# Patient Record
Sex: FEMALE | NOT HISPANIC OR LATINO | URBAN - METROPOLITAN AREA
[De-identification: names, ages, dates, MRNs, and addresses within clinical notes are randomized per-mention and may not be internally consistent; named-entity substitution may affect disease eponyms.]

---

## 2024-05-23 ENCOUNTER — APPOINTMENT (OUTPATIENT)
Dept: AUDIOLOGY | Facility: CLINIC | Age: 79
End: 2024-05-23

## 2024-07-18 ENCOUNTER — APPOINTMENT (OUTPATIENT)
Dept: PHYSICAL THERAPY | Facility: CLINIC | Age: 79
End: 2024-07-18
Payer: MEDICARE

## 2024-07-22 ENCOUNTER — APPOINTMENT (OUTPATIENT)
Dept: AUDIOLOGY | Facility: CLINIC | Age: 79
End: 2024-07-22

## 2024-07-22 DIAGNOSIS — H90.3 SENSORINEURAL HEARING LOSS (SNHL) OF BOTH EARS: Primary | ICD-10-CM

## 2024-07-22 PROCEDURE — 92557 COMPREHENSIVE HEARING TEST: CPT | Performed by: AUDIOLOGIST

## 2024-07-22 PROCEDURE — 92550 TYMPANOMETRY & REFLEX THRESH: CPT | Performed by: AUDIOLOGIST

## 2024-07-22 NOTE — PROGRESS NOTES
AUDIOLOGY ADULT AUDIOMETRIC EVALUATION    Name:  Rosy Brownnig  :  1945  Age:  78 y.o.  Date of Evaluation:  2024    Reason for visit: Ms. Browning is seen in the clinic today at the request of otolaryngology for an audiologic evaluation.     HISTORY  Patient complains of  not hearing as well with or without aids and has been researching hearing aids.  She denies dizzy, tinnitus and fullness and aids are from .    EVALUATION  See scanned audiogram: “Media” > “Audiology Report”.      RESULTS  Otoscopic Evaluation:  Right Ear: clear ear canal  Left Ear: clear ear canal    Immittance Measures:  Tympanometry:  Right Ear: Type As, reduced tympanic membrane mobility with normal middle ear pressure   Left Ear: Type As, reduced tympanic membrane mobility with normal middle ear pressure     Acoustic Reflexes:  Ipsilateral Right Ear: NR NR NR NR   Ipsilateral Left Ear:   NR NR NR NR  Contralateral Right Ear: did not evaluate  Contralateral Left Ear: did not evaluate      Audiometry:  Test Technique and Reliability:  Behavioral   Standard audiometry via supra-aural headphones. Reliability is good.    Pure tone air and bone conduction audiometry:  Right Ear:  STABLE FROM , MILD MODERATE TO SEVERE SNHL.  Left Ear: STABLE SNHL FROM . MILD MODERATE TO SEVERE SNHL.    Speech Audiometry (Word Recognition Scores):   Right Ear:  76 % FAIR  Left Ear:    89 % GOOD    IMPRESSIONS  STABLE HEARING LOSS FROM .  The presence of acoustic reflexes within normal intensity limits is consistent with normal middle ear and brainstem function, and suggests that auditory sensitivity is not significantly impaired. An elevated or absent acoustic reflex threshold is consistent with a middle ear disorder, hearing loss in the stimulated ear, and/or interruption of neural innervation of the stapedius muscle. Present DPOAEs suggest normal/near normal cochlear outer hair cell function and are consistent with no greater  than a mild hearing loss at those frequencies. Absent DPOAEs are consistent with abnormal cochlear outer hair cell function and some degree of hearing loss at those frequencies.    RECOMMENDATIONS  - Follow up with otolaryngology today as scheduled.SEE ENT AS RECOMMENDED FOR HEARING LOSS.  - Audiologic evaluation as needed.  - Annual audiologic evaluation, sooner if an acute change is noted.  - Audiologic evaluation in conjunction with otologic care, if an acute change is noted, and/or annually.  - Consider NEW hearing aids. DISCUSSED AND GAVE PRICING AND [employer]/ [employer]. Contact insurance to determine if there is an applicable benefit and where it can be used. Contact our office to schedule an appointment should she wish to proceed with hearing aids through our clinic.  - Consider  NEW hearing aids. Contact insurance to determine if there is an applicable benefit and where it can be used.  - Continued hearing aid use. AIDS ARE OLD AND DIFFICULT TO CONNECT.   - Follow-up with audiology annually for routine hearing aid maintenance, sooner if questions/problems arise.  - Follow-up with medical care team as planned.    PATIENT EDUCATION  Discussed results, impressions and recommendations with the patient. Questions were addressed and the patient was encouraged to contact our office should concerns arise.    Time for this encounter: 1 HOUR    HEARING AID CHECK    RIGHT: PHONAK AUDEO S SMART 5  C SHELL.  SN: [id number]  LEFT:PHONAK AUDEO S SMART 5  C SHELL.  SN: [id number]  FIT DATE: 2014  WARRANTY: NONE    This patient was seen for a HAC with the complaint that  she may want new aids and not hearing as well.   Otoscopy : CLEAR     The following programming changes were made: difficult connection , but cleaned and checked and raised as much as possible.  Only wearing a few hours per day but is interested in new aids.  Gave information and discussed but will need a HAE. Reminded to see ENT as has been a long time and  did put in new thresholds after audio today.    The patient paid  $65.00  for today's visit.  Return in 6 months or sooner if needed.    APPOINTMENT TIME:  total 1 hour for 2 appointments.  Nena Douglass  Licensed Audiologist

## 2024-08-20 ENCOUNTER — EVALUATION (OUTPATIENT)
Dept: PHYSICAL THERAPY | Facility: CLINIC | Age: 79
End: 2024-08-20
Payer: MEDICARE

## 2024-08-20 DIAGNOSIS — R29.898 MUSCULAR DECONDITIONING: ICD-10-CM

## 2024-08-20 DIAGNOSIS — Z74.09 DECLINING MOBILITY: ICD-10-CM

## 2024-08-20 DIAGNOSIS — G89.29 CHRONIC LOW BACK PAIN: ICD-10-CM

## 2024-08-20 DIAGNOSIS — M54.50 CHRONIC LOW BACK PAIN: ICD-10-CM

## 2024-08-20 DIAGNOSIS — M48.061 LUMBAR STENOSIS: ICD-10-CM

## 2024-08-20 PROCEDURE — 97161 PT EVAL LOW COMPLEX 20 MIN: CPT | Mod: GP

## 2024-08-20 ASSESSMENT — PAIN - FUNCTIONAL ASSESSMENT: PAIN_FUNCTIONAL_ASSESSMENT: 0-10

## 2024-08-20 ASSESSMENT — PAIN DESCRIPTION - DESCRIPTORS: DESCRIPTORS: ACHING

## 2024-08-20 ASSESSMENT — PAIN SCALES - GENERAL: PAINLEVEL_OUTOF10: 5 - MODERATE PAIN

## 2024-08-20 NOTE — PROGRESS NOTES
Physical Therapy Evaluation     Patient Name: Rosy Browning  MRN: [id number]  Today's Date: 8/20/2024  Time Calculation  Start Time: 1500  Stop Time: 1540  Time Calculation (min): 40 min  PT Evaluation Time Entry  PT Evaluation (Low) Time Entry: 40        Insurance Considerations:  MEDICARE A/B, MMO SUPP, [place], NO AUTH ( $0 USED PT/ST)     Problem List Items Addressed This Visit    None  Visit Diagnoses         Codes    Chronic low back pain     M54.50, G89.29    Relevant Orders    Follow Up In Physical Therapy    Lumbar stenosis     M48.061    Relevant Orders    Follow Up In Physical Therapy    Muscular deconditioning     R29.898    Relevant Orders    Follow Up In Physical Therapy    Declining mobility     Z74.09    Relevant Orders    Follow Up In Physical Therapy            Subjective  /General:  General  Reason for Referral: Chronic low back pain, lumbar stenosis, muscular deconditioning, declining mobility  Referred By: Ziggy Murray  Patient reported hx of current condition: The pt presents accompanied by her . The pt reports that she has had low back pain with radiating symptoms into both legs for 4-5 years and it began insidiously. Since December, she has had 3 epidural injections and they have helped (most recent was mid July). Since the most recent injection, her pain has been limited to the lateral L thigh. She notes that she tried PT for a few sessions 1-2 years ago and she primarily focused on balance, but did not feel that she made significant progress. The pt is interested in aquatic PT as well as gait training and PT that will allow her to meet her goal of participating in different types of exercise, such as yoga, where she may need to get up and down off the floor in the future (she is currently unable to).     Aggravating factors: walking (immediately), standing, stair climbing   Relieving factors: sitting, laying down    Precautions/ Red Flags:  Precautions  Precautions Comment: + hx  of inner thigh numbness    Red flags   Hx of CA No   Pacemaker/ Electronic Implant No   Saddle Anesthesia Yes - has inner thigh numbness when her epidural injections wear off    Bowel/Bladder Changes No   Sudden Weakness No   Recent Falls (within last 6mo) No     Relevant PMH:  L TKA 2017  Spinal stenosis  Nueropathy    Pain:  Pain Assessment  Pain Assessment: 0-10  0-10 (Numeric) Pain Score: 5 - Moderate pain  Pain Type: Chronic pain  Pain Location: Back  Pain Orientation: Mid, Lower  Pain Radiating Towards: L glute and lateral L thigh -- has hx of radiating into bilat glutes and LEs  Pain Descriptors: Aching  Home Living:    Lives with: Spouse  Home type: House  Stairs: Yes - has handrails  Occupation: retired  Hobbies/activities: walking   Prior Function Per Pt/Caregiver Report:  Prior Function Per Pt/Caregiver Report  Level of [place]:  (The pt reports that she utilizes a SPC sometimes)    Objective   Posture:  Posture Comment: The pt sits with a forward flexed trunk. The pt stands with a guarded posture, wide DAV, and flexed trunk.  Gait:  Gait Comment: The pt ambulates mod I with a SPC. The pt ambulates with a wide DAV, shortened step lengths, and flexed trunk posture. The pt appears to struggle with navigating a straight path.  Other:     Lumbar AROM Range   Flexion 50% limitation    Extension 75% limitation   R sidebend 75% limitation   L sidebend 75% limitation   Comments: The pt appears very guarded in movements and unsteady when standing without support. The pt noted discomfort with all movements.      Hip MMT L R   Hip Flexion 4-/5 4-/5   External Rotation 4/5 4/5   Internal Rotation 4/5 4/5      Knee MMT L R   Knee Flexion 4+/5 4+/5   Knee Extension 4+/5 4+/5      Ankle MMT L R   Dorsiflexion 4+/5 4+/5   Plantarflexion 4+/5 4+/5     Outcome Measures:  Other Measures  5x Sit to Stand: 25.31sec  Oswestry Disablity Index (DARLING): 23/50     Assessment:  PT Assessment Results: Decreased strength,  Decreased range of motion, Decreased mobility, Impaired balance, Pain  Rehab Prognosis: Fair    Pt is a 78 y.o. female who presents with signs and symptoms consistent with stenotic changes of the lumbar spine as well as physical deconditioning. The current impairments have led to functional limitations that include: walking, standing, and participating in her preferred types of exercise, such as yoga. Pt would benefit from skilled physical therapy intervention to improve impairments and facilitate return to prior function.    Plan:  Treatment/Interventions: Aquatic therapy, Cryotherapy, Education/ Instruction, Electrical stimulation, Gait training, Manual therapy, Neuromuscular re-education, Therapeutic activities, Therapeutic exercises  PT Plan: Skilled PT  PT Frequency:  (1-2x/wk)  Duration: 10 visits (Consider transition to land when appropriate to work on gait, balance, and strengthening to help pt get on and off the floor for her goal of participation in yoga classes.)  Onset Date: 08/20/24  Certification Period Start Date: 08/20/24  Certification Period End Date: 11/18/24  Number of Treatments Authorized: MN/MC cap  Rehab Potential: Fair    Plan for next visit: Initiate aquatics    OP EDUCATION:  Outpatient Education  Individual(s) Educated: Patient, Spouse  Education Provided: Fall Risk, POC, Other (aquatic PT info)  Risk and Benefits Discussed with Patient/Caregiver/Other: yes  Patient/Caregiver Demonstrated Understanding: yes  Plan of Care Discussed and Agreed Upon: yes  Patient Response to Education: Patient/Caregiver Verbalized Understanding of Information, Patient/Caregiver Asked Appropriate Questions  Provided tour of the pool area as well as education regarding aquatic PT sessions.    Today's Treatment:  Tour of the pool area    Goals:  Active       PT Problem       PT STG       Start:  08/20/24    Expected End:  10/04/24       - Pt will complete the HEP with <3 verbal cues for correction,   - Pt will  demonstrate 2pt improvement on the NPRS, allowing for improved tolerance of functional activities.,   - Pt will demonstrate min losses in all lumbar AROM, without onset of pain, allowing for improved ability to perform functional activities.,   - Pt will demonstrate ability to ambulate 300' with least restrictive device and equal stance time bilat in order to begin demoing normalized gait pattern.,   - Pt will demonstrate at least 4/5 MMT grading throughout LE musculature, allowing for appropriate muscle recruitment during daily activities. ,          PT LTG       Start:  08/20/24    Expected End:  11/18/24       - Pt will be independent in HEP & symptom management,   - Pt will demonstrate 4pt improvement on the NPRS, allowing for improved tolerance of functional activities. ,   - Pt will demonstrate ability to ambulate at least 350' with least restrictive/no device without breaks or increases in pain in order to demonstrate improved tolerance to prolonged ambulation.   - Pt will obtain and maintain a neutral posture throughout PT session in order to allow for proper muscle length-tension relationships and recruitment for daily tasks,   - Pt will demonstrate improved 5x sit to stand to <16 seconds in order to decrease risk of falls.  - Pt will demonstrate ability to ambulate up the pool steps with reciprocal pattern and 1 UE support.   - Pt will demonstrate improved OSWESTRY score by 13 points (MCID) in order to demonstrate improved functional mobility.

## 2024-08-20 NOTE — LETTER
August 20, 2024    Ziggy Murray MD  [address] [facility] [address]    Patient: Rosy Browning   YOB: 1945   Date of Visit: 8/20/2024       Dear Ziggy Murray MD  [address] [id number]    The attached plan of care is being sent to you because your patient’s medical reimbursement requires that you certify the plan of care. Your signature is required to allow uninterrupted insurance coverage.      You may indicate your approval by signing below and faxing this form back to us at Dept Fax: [phone].    Please call Dept: [phone] with any questions or concerns.    Thank you for this referral,        Iram Rome PT  [facility] [address] [place] [address]    Payer: Payor: MEDICARE / Plan: MEDICARE PART A AND B / Product Type: *No Product type* /                                                                         Date:     Dear Iram Rome PT,     Re: Ms. Rosy Browning, MRN:[id number]    I certify that I have reviewed the attached plan of care and it is medically necessary for Ms. Rosy Browning (1945) who is under my care.          ______________________________________                    _________________  Provider name and credentials                                           Date and time                                                                                           Plan of Care 8/20/24   Effective from: 8/20/2024  Effective to: 11/18/2024    Plan ID: [id number]            Participants as of Finalize on 8/20/2024    Name Type Comments Contact Info    Ziggy Murray MD Referring Provider  [phone]    Iram Rome PT Physical Therapist  [phone]       Last Plan Note     Author: Iram Rome PT Status: Incomplete Last edited: 8/20/2024  3:00 PM           Physical Therapy Evaluation     Patient Name: Rosy Browning  MRN: [id number]  Today's Date:  8/20/2024  Time Calculation  Start Time: 1500  Stop Time: 1540  Time Calculation (min): 40 min  PT Evaluation Time Entry  PT Evaluation (Low) Time Entry: 40        Insurance Considerations:  MEDICARE A/B, MMO SUPP, [place], NO AUTH ( $0 USED PT/ST)     Problem List Items Addressed This Visit    None  Visit Diagnoses         Codes    Chronic low back pain     M54.50, G89.29    Relevant Orders    Follow Up In Physical Therapy    Lumbar stenosis     M48.061    Relevant Orders    Follow Up In Physical Therapy    Muscular deconditioning     R29.898    Relevant Orders    Follow Up In Physical Therapy    Declining mobility     Z74.09    Relevant Orders    Follow Up In Physical Therapy            Subjective /General:  General  Reason for Referral: Chronic low back pain, lumbar stenosis, muscular deconditioning, declining mobility  Referred By: Ziggy Murray  Patient reported hx of current condition: The pt presents accompanied by her . The pt reports that she has had low back pain with radiating symptoms into both legs for 4-5 years and it began insidiously. Since December, she has had 3 epidural injections and they have helped (most recent was mid July). Since the most recent injection, her pain has been limited to the lateral L thigh. She notes that she tried PT for a few sessions 1-2 years ago and she primarily focused on balance, but did not feel that she made significant progress. The pt is interested in aquatic PT as well as gait training and PT that will allow her to meet her goal of participating in different types of exercise, such as yoga, where she may need to get up and down off the floor in the future (she is currently unable to).     Aggravating factors: walking (immediately), standing, stair climbing   Relieving factors: sitting, laying down    Precautions/ Red Flags:  Precautions  Precautions Comment: + hx of inner thigh numbness    Red flags   Hx of CA No   Pacemaker/ Electronic Implant No   Saddle  Anesthesia Yes - has inner thigh numbness when her epidural injections wear off    Bowel/Bladder Changes No   Sudden Weakness No   Recent Falls (within last 6mo) No     Relevant PMH:  L TKA 2017  Spinal stenosis  Nueropathy    Pain:  Pain Assessment  Pain Assessment: 0-10  0-10 (Numeric) Pain Score: 5 - Moderate pain  Pain Type: Chronic pain  Pain Location: Back  Pain Orientation: Mid, Lower  Pain Radiating Towards: L glute and lateral L thigh -- has hx of radiating into bilat glutes and LEs  Pain Descriptors: Aching  Home Living:    Lives with: Spouse  Home type: House  Stairs: Yes - has handrails  Occupation: retired  Hobbies/activities: walking   Prior Function Per Pt/Caregiver Report:  Prior Function Per Pt/Caregiver Report  Level of [place]:  (The pt reports that she utilizes a SPC sometimes)    Objective  Posture:  Posture Comment: The pt sits with a forward flexed trunk. The pt stands with a guarded posture, wide DAV, and flexed trunk.  Gait:  Gait Comment: The pt ambulates mod I with a SPC. The pt ambulates with a wide DAV, shortened step lengths, and flexed trunk posture. The pt appears to struggle with navigating a straight path.  Other:     Lumbar AROM Range   Flexion 50% limitation    Extension 75% limitation   R sidebend 75% limitation   L sidebend 75% limitation   Comments: The pt appears very guarded in movements and unsteady when standing without support. The pt noted discomfort with all movements.      Hip MMT L R   Hip Flexion 4-/5 4-/5   External Rotation 4/5 4/5   Internal Rotation 4/5 4/5      Knee MMT L R   Knee Flexion 4+/5 4+/5   Knee Extension 4+/5 4+/5      Ankle MMT L R   Dorsiflexion 4+/5 4+/5   Plantarflexion 4+/5 4+/5     Outcome Measures:  Other Measures  5x Sit to Stand: 25.31sec  Oswestry Disablity Index (DARLING): 23/50     Assessment:  PT Assessment Results: Decreased strength, Decreased range of motion, Decreased mobility, Impaired balance, Pain  Rehab Prognosis: Fair    Pt is a  78 y.o. female who presents with signs and symptoms consistent with stenotic changes of the lumbar spine as well as physical deconditioning. The current impairments have led to functional limitations that include: walking, standing, and participating in her preferred types of exercise, such as yoga. Pt would benefit from skilled physical therapy intervention to improve impairments and facilitate return to prior function.    Plan:  Treatment/Interventions: Aquatic therapy, Cryotherapy, Education/ Instruction, Electrical stimulation, Gait training, Manual therapy, Neuromuscular re-education, Therapeutic activities, Therapeutic exercises  PT Plan: Skilled PT  PT Frequency:  (1-2x/wk)  Duration: 10 visits (Consider transition to land when appropriate to work on gait, balance, and strengthening to help pt get on and off the floor for her goal of participation in yoga classes.)  Onset Date: 08/20/24  Certification Period Start Date: 08/20/24  Certification Period End Date: 11/18/24  Number of Treatments Authorized: MN/JOHANA cap  Rehab Potential: Fair    Plan for next visit: Initiate aquatics    OP EDUCATION:  Outpatient Education  Individual(s) Educated: Patient, Spouse  Education Provided: Fall Risk, POC, Other (aquatic PT info)  Risk and Benefits Discussed with Patient/Caregiver/Other: yes  Patient/Caregiver Demonstrated Understanding: yes  Plan of Care Discussed and Agreed Upon: yes  Patient Response to Education: Patient/Caregiver Verbalized Understanding of Information, Patient/Caregiver Asked Appropriate Questions  Provided tour of the pool area as well as education regarding aquatic PT sessions.    Today's Treatment:  Tour of the pool area    Goals:  Active       PT Problem       PT STG       Start:  08/20/24    Expected End:  10/04/24       - Pt will complete the HEP with <3 verbal cues for correction,   - Pt will demonstrate 2pt improvement on the NPRS, allowing for improved tolerance of functional activities.,   -  Pt will demonstrate min losses in all lumbar AROM, without onset of pain, allowing for improved ability to perform functional activities.,   - Pt will demonstrate ability to ambulate 300' with least restrictive device and equal stance time bilat in order to begin demoing normalized gait pattern.,   - Pt will demonstrate at least 4/5 MMT grading throughout LE musculature, allowing for appropriate muscle recruitment during daily activities. ,          PT LTG       Start:  08/20/24    Expected End:  11/18/24       - Pt will be independent in HEP & symptom management,   - Pt will demonstrate 4pt improvement on the NPRS, allowing for improved tolerance of functional activities. ,   - Pt will demonstrate ability to ambulate at least 350' with least restrictive/no device without breaks or increases in pain in order to demonstrate improved tolerance to prolonged ambulation.   - Pt will obtain and maintain a neutral posture throughout PT session in order to allow for proper muscle length-tension relationships and recruitment for daily tasks,   - Pt will demonstrate improved 5x sit to stand to <16 seconds in order to decrease risk of falls.  - Pt will demonstrate ability to ambulate up the pool steps with reciprocal pattern and 1 UE support.   - Pt will demonstrate improved OSWESTRY score by 13 points (MCID) in order to demonstrate improved functional mobility.                                      Current Participants as of 8/20/2024    Name Type Comments Contact Info    Ziggy Murray MD Referring Provider  [phone]    Signature pending    Iram Rome PT Physical Therapist  [phone]

## 2024-09-09 NOTE — PROGRESS NOTES
Physical Therapy Treatment    Patient Name: Rosy Browning  MRN: [id number]  Encounter date:  9/10/2024  Time Calculation  Start Time: 1404  Stop Time: 1445  Time Calculation (min): 41 min  PT Therapeutic Intervention time entry:  Aquatic: 41    Visit Number:  2 (including evaluation)  Planned total visits: 10  Visits Authorized/Insurance Coverage:  MEDIC[place] A/B, [place], [place], [place] ( $0 USED PT/ST)     Current Problem  Problem List Items Addressed This Visit    None  Visit Diagnoses         Codes    Chronic low back pain     M54.50, G89.29    Lumbar stenosis     M48.061    Muscular deconditioning     R29.898    Declining mobility     Z74.09            Precautions  + hx of inner thigh numbness   Relevant PMH:  L TKA 2017  Spinal stenosis  Neuropathy    Pain  6-7/10 L hip pain     Subjective  General      Patient reports increased L hip/IT band pain following the initial evaluation. Patient asked for therex to perform on own in a pool at home.    Objective      Patient entered appointment with SPC for assistance during ambulation. Fair core control, minimal contralateral tilt during WB 3-way hip. Poor core control during seated therex, KARL support on rail by bench.    Treatment:  Aquatic Therapy:   Patient entered pool with step to gait, BUE support on rails  Walk along rail, 3.5'-4', for core control, balance and acclimation to water:  forward/backward/side step x 3 laps each.    At HR with UE support:  Heel raise x 15 reps  Toe raise x 15 reps  Hip ABD/EXT/SLR x 15 reps  HS curls x 15 reps  Mini-squats x 15 reps    FWD walking across pool without noodle x 2 laps    At bench:  Sit to stands, no UE's x 10 reps  LAQ's 2'  Marching x 2'  Ankle pumps  2'   Flutter kicks 2'  Bicycle 2'     At steps:  HS stretch 20 sec x 2 reps R/L LE's    Patient exited pool with reciprocal gait, BUE support of rails.     Current HEP:  Provided handout of exercises to be completed on own in pool at home and reviewed which  activities were performed today.      Activity tolerance:  Good    OP EDUCATION:      Assessment:     Patient walked along rail for water walking to start appointment to evaluate balance and ability to ambulate in water. Patient later walked across pool with BUE support on black pool noodle, potential to perform across pool ambulation to start appointments, Patient was asked if she wanted to attempt activities in the deep end, declined and treatment focused on standing and seated therex. Moderate verbal and visual cueing provided for exercise instruction and to refine performance, fair carryover. Mild decrease in L hip/IT band pain when standing on pool deck after treatment.     Pt's response to treatment:  Good  Areas of improvements: Improved exercise endurance, decreased L hip pain   Limitations/deficits: Decreased BLE strength and balance deficits     Pain end of session: 4/10    Plan:    Continue with current POC/no changes    Assessment of current progress against goals:  Progressing toward functional goals    Goals:    Active         PT Problem         PT STG         Start:  08/20/24    Expected End:  10/04/24        - Pt will complete the HEP with <3 verbal cues for correction,   - Pt will demonstrate 2pt improvement on the NPRS, allowing for improved tolerance of functional activities.,   - Pt will demonstrate min losses in all lumbar AROM, without onset of pain, allowing for improved ability to perform functional activities.,   - Pt will demonstrate ability to ambulate 300' with least restrictive device and equal stance time bilat in order to begin demoing normalized gait pattern.,   - Pt will demonstrate at least 4/5 MMT grading throughout LE musculature, allowing for appropriate muscle recruitment during daily activities. ,            PT LTG         Start:  08/20/24    Expected End:  11/18/24        - Pt will be independent in HEP & symptom management,   - Pt will demonstrate 4pt improvement on the NPRS,  allowing for improved tolerance of functional activities. ,   - Pt will demonstrate ability to ambulate at least 350' with least restrictive/no device without breaks or increases in pain in order to demonstrate improved tolerance to prolonged ambulation.   - Pt will obtain and maintain a neutral posture throughout PT session in order to allow for proper muscle length-tension relationships and recruitment for daily tasks,   - Pt will demonstrate improved 5x sit to stand to <16 seconds in order to decrease risk of falls.  - Pt will demonstrate ability to ambulate up the pool steps with reciprocal pattern and 1 UE support.   - Pt will demonstrate improved OSWESTRY score by 13 points (MCID) in order to demonstrate improved functional mobility.

## 2024-09-10 ENCOUNTER — TREATMENT (OUTPATIENT)
Dept: PHYSICAL THERAPY | Facility: CLINIC | Age: 79
End: 2024-09-10
Payer: MEDICARE

## 2024-09-10 DIAGNOSIS — M54.50 CHRONIC LOW BACK PAIN: ICD-10-CM

## 2024-09-10 DIAGNOSIS — Z74.09 DECLINING MOBILITY: ICD-10-CM

## 2024-09-10 DIAGNOSIS — R29.898 MUSCULAR DECONDITIONING: ICD-10-CM

## 2024-09-10 DIAGNOSIS — M48.061 LUMBAR STENOSIS: ICD-10-CM

## 2024-09-10 DIAGNOSIS — G89.29 CHRONIC LOW BACK PAIN: ICD-10-CM

## 2024-09-10 PROCEDURE — 97113 AQUATIC THERAPY/EXERCISES: CPT | Mod: CQ,GP | Performed by: SPECIALIST/TECHNOLOGIST

## 2024-09-17 ENCOUNTER — TREATMENT (OUTPATIENT)
Dept: PHYSICAL THERAPY | Facility: CLINIC | Age: 79
End: 2024-09-17
Payer: MEDICARE

## 2024-09-17 DIAGNOSIS — M48.061 LUMBAR STENOSIS: ICD-10-CM

## 2024-09-17 DIAGNOSIS — R29.898 MUSCULAR DECONDITIONING: ICD-10-CM

## 2024-09-17 DIAGNOSIS — G89.29 CHRONIC LOW BACK PAIN: ICD-10-CM

## 2024-09-17 DIAGNOSIS — Z74.09 DECLINING MOBILITY: ICD-10-CM

## 2024-09-17 DIAGNOSIS — M54.50 CHRONIC LOW BACK PAIN: ICD-10-CM

## 2024-09-17 PROCEDURE — 97113 AQUATIC THERAPY/EXERCISES: CPT | Mod: GP

## 2024-09-17 ASSESSMENT — PAIN SCALES - GENERAL: PAINLEVEL_OUTOF10: 7

## 2024-09-17 NOTE — PROGRESS NOTES
Physical Therapy Treatment    Patient Name: Rosy Browning  MRN: [id number]  Today's Date: 9/17/2024  Time Calculation  Start Time: 1345  Stop Time: 1425  Time Calculation (min): 40 min     PT Therapeutic Procedures Time Entry  Aquatic Therapy Time Entry: 40    Visit Number:  3 (including evaluation)  Planned total visits: 10 visits (Consider transition to land when appropriate to work on gait, balance, and strengthening to help pt get on and off the floor for her goal of participation in yoga classes.)   Visit Authorized/ Insurance Considerations:  MEDICARE A/B, [place], [place] ( $0 USED PT/ST)     Current Problem  Problem List Items Addressed This Visit    None  Visit Diagnoses         Codes    Chronic low back pain     M54.50, G89.29    Lumbar stenosis     M48.061    Muscular deconditioning     R29.898    Declining mobility     Z74.09          Precautions  Precautions  Precautions Comment: + hx of inner thigh numbness    Pain  0-10 (Numeric) Pain Score: 7  Pain Type: Chronic pain  Pain Location: Back    Subjective/General  Reason for Referral: Chronic low back pain, lumbar stenosis, muscular deconditioning, declining mobility  Referred By: Ziggy Murray   The pt returns to the clinic stating that she felt good after last session. Has been struggling to walk d/t the ITB pain. The pt anticipates a knee injection on Monday.    Objective  Pt ascends pool stairs with step to pattern leading with L LE and utilizing bilat UE support of rails    Treatment - Aquatic Therapy:      Walk across pool, 3.75' forward/backward/side step x 3 laps each.     3.75' depth: At HR with UE support:  Heel raise x 15 reps  Toe raise x 15 reps  Hip ABD/EXT/SLR x 15 reps  HS curls x 15 reps  Mini-squats x 10 reps     3.75' depth: marching across pool without noodle x 2 laps     4.25' depth - started with noodle under arms, but pt opted to just hold the rail - pt did not want to move into deeper water:  - Hang x2'   - Bicycle  kicks x2'  - Hip abd/add x2'    At bench:  Sit to stands, no UE's x 20 reps  LAQ's 2'  Marching x 2'  Ankle pumps  2'   Flutter kicks 2'    Current HEP:  Aquatic handout    OP Education:       Assessment:     Pt's response to treatment:  Fair - the pt appeared to have difficulty with coordination of movement, requiring frequent cues for correct performance and form with exercises. Attempted to utilize deeper water to assist with pain control, however pt was uncomfortable with use of the noodle and did not prefer to stay in deeper water.   Areas of improvements:  slight improvement in pain levels when exiting   Limitations/deficits: form with exercise, uneasiness in deeper water     Pain end of session:  6/10    Plan:  OP PT Plan  Treatment/Interventions: Aquatic therapy, Cryotherapy, Education/ Instruction, Electrical stimulation, Gait training, Manual therapy, Neuromuscular re-education, Therapeutic activities, Therapeutic exercises  PT Plan: Skilled PT  PT Frequency:  (1-2x/wk)  Duration: 10 visits (Consider transition to land when appropriate to work on gait, balance, and strengthening to help pt get on and off the floor for her goal of participation in yoga classes.)  Onset Date: 08/20/24  Certification Period Start Date: 08/20/24  Certification Period End Date: 11/18/24  Number of Treatments Authorized: [place]/MC cap  Rehab Potential: Fair  Continue with current POC/no changes -- consider straddling noodle to allow for floating exercises in shallow water in an upright position     Assessment of current progress against goals:  Insufficient treatment time to assess progress  Goals:  Active       PT Problem       PT STG       Start:  08/20/24    Expected End:  10/04/24       - Pt will complete the HEP with <3 verbal cues for correction,   - Pt will demonstrate 2pt improvement on the NPRS, allowing for improved tolerance of functional activities.,   - Pt will demonstrate min losses in all lumbar AROM, without onset of  pain, allowing for improved ability to perform functional activities.,   - Pt will demonstrate ability to ambulate 300' with least restrictive device and equal stance time bilat in order to begin demoing normalized gait pattern.,   - Pt will demonstrate at least 4/5 MMT grading throughout LE musculature, allowing for appropriate muscle recruitment during daily activities. ,          PT LTG       Start:  08/20/24    Expected End:  11/18/24       - Pt will be independent in HEP & symptom management,   - Pt will demonstrate 4pt improvement on the NPRS, allowing for improved tolerance of functional activities. ,   - Pt will demonstrate ability to ambulate at least 350' with least restrictive/no device without breaks or increases in pain in order to demonstrate improved tolerance to prolonged ambulation.   - Pt will obtain and maintain a neutral posture throughout PT session in order to allow for proper muscle length-tension relationships and recruitment for daily tasks,   - Pt will demonstrate improved 5x sit to stand to <16 seconds in order to decrease risk of falls.  - Pt will demonstrate ability to ambulate up the pool steps with reciprocal pattern and 1 UE support.   - Pt will demonstrate improved OSWESTRY score by 13 points (MCID) in order to demonstrate improved functional mobility.

## 2024-09-24 ENCOUNTER — APPOINTMENT (OUTPATIENT)
Dept: PHYSICAL THERAPY | Facility: CLINIC | Age: 79
End: 2024-09-24
Payer: MEDICARE

## 2024-09-26 ENCOUNTER — TREATMENT (OUTPATIENT)
Dept: PHYSICAL THERAPY | Facility: CLINIC | Age: 79
End: 2024-09-26
Payer: MEDICARE

## 2024-09-26 DIAGNOSIS — M48.061 LUMBAR STENOSIS: ICD-10-CM

## 2024-09-26 DIAGNOSIS — R29.898 MUSCULAR DECONDITIONING: ICD-10-CM

## 2024-09-26 DIAGNOSIS — M54.50 CHRONIC LOW BACK PAIN: ICD-10-CM

## 2024-09-26 DIAGNOSIS — Z74.09 DECLINING MOBILITY: ICD-10-CM

## 2024-09-26 DIAGNOSIS — G89.29 CHRONIC LOW BACK PAIN: ICD-10-CM

## 2024-09-26 PROCEDURE — 97113 AQUATIC THERAPY/EXERCISES: CPT | Mod: GP

## 2024-09-26 ASSESSMENT — PAIN SCALES - GENERAL: PAINLEVEL_OUTOF10: 6

## 2024-09-26 NOTE — PROGRESS NOTES
Physical Therapy Treatment    Patient Name: Rosy Browning  MRN: [id number]  Today's Date: 9/26/2024  Time Calculation  Start Time: 1345  Stop Time: 1425  Time Calculation (min): 40 min     PT Therapeutic Procedures Time Entry  Aquatic Therapy Time Entry: 40    Visit Number:  4 (including evaluation)  Planned total visits: 10 visits (Consider transition to land when appropriate to work on gait, balance, and strengthening to help pt get on and off the floor for her goal of participation in yoga classes.)   Visit Authorized/ Insurance Considerations:  MEDICARE A/B, M[place], [place] ( $0 USED PT/ST)     Current Problem  Problem List Items Addressed This Visit    None  Visit Diagnoses         Codes    Chronic low back pain     M54.50, G89.29    Lumbar stenosis     M48.061    Muscular deconditioning     R29.898    Declining mobility     Z74.09            Precautions  Precautions  Precautions Comment: + hx of inner thigh numbness    Pain  0-10 (Numeric) Pain Score: 6  Pain Type: Chronic pain  Pain Location: Back (thigh)    Subjective/General  Reason for Referral: Chronic low back pain, lumbar stenosis, muscular deconditioning, declining mobility  Referred By: Ziggy Murray   The pt returns stating that her pain was an 8 in her thigh this AM, but advil helped decrease is slightly.     Objective  Pt ascends pool stairs with step to pattern leading with L LE and utilizing bilat UE support of rails    Treatment - Aquatic Therapy:      Walk across pool, 3.75' forward/backward/side step x 3 laps each.     3.75' depth: At HR with UE support:  Heel raise x 15 reps  Toe raise x 15 reps  Hip ABD/EXT/SLR x 15 reps  HS curls x 15 reps  Mini-squats x 10 reps     4' depth Straddling noodle and holding onto rail   Bicycle kicks x2'  Hip abd/add x2'  XC ski x2'  Alterning SKTC x2'   Alternating HS curls x2'    3.75' depth: marching across pool without noodle x 2 laps  3.75' depth tandem walk      At steps:  Hamstring stretch  "20\" x2     Current HEP:  Aquatic handout    OP Education:       Assessment:     Pt's response to treatment:  Good - pt noted improvement with thigh symptoms after exercises performed seated on noodle   Areas of improvements: pain levels  Limitations/deficits: form with exercise, uneasiness in deeper water     Pain end of session:  4/10    Plan:  OP PT Plan  Treatment/Interventions: Aquatic therapy, Cryotherapy, Education/ Instruction, Electrical stimulation, Gait training, Manual therapy, Neuromuscular re-education, Therapeutic activities, Therapeutic exercises  PT Plan: Skilled PT  PT Frequency:  (1-2x/wk)  Duration: 10 visits (Consider transition to land when appropriate to work on gait, balance, and strengthening to help pt get on and off the floor for her goal of participation in yoga classes.)  Onset Date: 08/20/24  Certification Period Start Date: 08/20/24  Certification Period End Date: 11/18/24  Number of Treatments Authorized: [place]/MC cap  Rehab Potential: Fair  Continue with current POC/no changes -- pt requesting aquatics packet (bring next session)    Assessment of current progress against goals:  Insufficient treatment time to assess progress  Goals:  Active       PT Problem       PT STG       Start:  08/20/24    Expected End:  10/04/24       - Pt will complete the HEP with <3 verbal cues for correction,   - Pt will demonstrate 2pt improvement on the NPRS, allowing for improved tolerance of functional activities.,   - Pt will demonstrate min losses in all lumbar AROM, without onset of pain, allowing for improved ability to perform functional activities.,   - Pt will demonstrate ability to ambulate 300' with least restrictive device and equal stance time bilat in order to begin demoing normalized gait pattern.,   - Pt will demonstrate at least 4/5 MMT grading throughout LE musculature, allowing for appropriate muscle recruitment during daily activities. ,          PT LTG       Start:  08/20/24    Expected " End:  11/18/24       - Pt will be independent in HEP & symptom management,   - Pt will demonstrate 4pt improvement on the NPRS, allowing for improved tolerance of functional activities. ,   - Pt will demonstrate ability to ambulate at least 350' with least restrictive/no device without breaks or increases in pain in order to demonstrate improved tolerance to prolonged ambulation.   - Pt will obtain and maintain a neutral posture throughout PT session in order to allow for proper muscle length-tension relationships and recruitment for daily tasks,   - Pt will demonstrate improved 5x sit to stand to <16 seconds in order to decrease risk of falls.  - Pt will demonstrate ability to ambulate up the pool steps with reciprocal pattern and 1 UE support.   - Pt will demonstrate improved OSWESTRY score by 13 points (MCID) in order to demonstrate improved functional mobility.

## 2024-10-02 NOTE — PROGRESS NOTES
"    Physical Therapy Treatment    Patient Name: Rosy Browning  MRN: [id number]  Today's Date: 10/2/2024             Visit Number:  5 (including evaluation)  Planned total visits: 10 visits (Consider transition to land when appropriate to work on gait, balance, and strengthening to help pt get on and off the floor for her goal of participation in yoga classes.)   Visit Authorized/ Insurance Considerations:  MEDICARE A/B, MMO [place], [place], [place] AUTH ( $0 USED PT/ST)     Current Problem  Problem List Items Addressed This Visit    None        Precautions  + hx of inner thigh numbness     Pain      Subjective/General  The pt returns stating that her pain was an 8 in her thigh this AM, but advil helped decrease is slightly.     Objective  Pt ascends pool stairs with step to pattern leading with L LE and utilizing bilat UE support of rails    Treatment - Aquatic Therapy:     Walk across pool, 3.75' forward/backward/side step x 3 laps each.     3.75' depth: At HR with UE support:  Heel raise x 15 reps  Toe raise x 15 reps  Hip ABD/EXT/SLR x 15 reps  HS curls x 15 reps  Mini-squats x 10 reps     4' depth Straddling noodle and holding onto rail   Bicycle kicks x2'  Hip abd/add x2'  XC ski x2'  Alterning SKTC x2'   Alternating HS curls x2'    3.75' depth: marching across pool without noodle x 2 laps  3.75' depth tandem walk      At steps:  Hamstring stretch 20\" x2     Current HEP:  Aquatic handout    OP Education:    Assessment:    Pt's response to treatment:  Good - pt noted improvement with thigh symptoms after exercises performed seated on noodle   Areas of improvements: pain levels  Limitations/deficits: form with exercise, uneasiness in deeper water     Pain end of session:  4/10    Plan:     Continue with current POC/no changes -- pt requesting aquatics packet (bring next session)    Assessment of current progress against goals:  Insufficient treatment time to assess progress  Goals:  Active       PT Problem       PT STG  "      Start:  08/20/24    Expected End:  10/04/24       - Pt will complete the HEP with <3 verbal cues for correction,   - Pt will demonstrate 2pt improvement on the NPRS, allowing for improved tolerance of functional activities.,   - Pt will demonstrate min losses in all lumbar AROM, without onset of pain, allowing for improved ability to perform functional activities.,   - Pt will demonstrate ability to ambulate 300' with least restrictive device and equal stance time bilat in order to begin demoing normalized gait pattern.,   - Pt will demonstrate at least 4/5 MMT grading throughout LE musculature, allowing for appropriate muscle recruitment during daily activities. ,          PT LTG       Start:  08/20/24    Expected End:  11/18/24       - Pt will be independent in HEP & symptom management,   - Pt will demonstrate 4pt improvement on the NPRS, allowing for improved tolerance of functional activities. ,   - Pt will demonstrate ability to ambulate at least 350' with least restrictive/no device without breaks or increases in pain in order to demonstrate improved tolerance to prolonged ambulation.   - Pt will obtain and maintain a neutral posture throughout PT session in order to allow for proper muscle length-tension relationships and recruitment for daily tasks,   - Pt will demonstrate improved 5x sit to stand to <16 seconds in order to decrease risk of falls.  - Pt will demonstrate ability to ambulate up the pool steps with reciprocal pattern and 1 UE support.   - Pt will demonstrate improved OSWESTRY score by 13 points (MCID) in order to demonstrate improved functional mobility.

## 2024-10-04 ENCOUNTER — APPOINTMENT (OUTPATIENT)
Dept: PHYSICAL THERAPY | Facility: CLINIC | Age: 79
End: 2024-10-04
Payer: MEDICARE

## 2024-10-10 ENCOUNTER — APPOINTMENT (OUTPATIENT)
Dept: PHYSICAL THERAPY | Facility: CLINIC | Age: 79
End: 2024-10-10
Payer: MEDICARE

## 2024-10-17 ENCOUNTER — APPOINTMENT (OUTPATIENT)
Dept: PHYSICAL THERAPY | Facility: CLINIC | Age: 79
End: 2024-10-17
Payer: MEDICARE

## 2024-11-06 ENCOUNTER — APPOINTMENT (OUTPATIENT)
Dept: PHYSICAL THERAPY | Facility: CLINIC | Age: 79
End: 2024-11-06
Payer: MEDICARE

## 2024-11-13 ENCOUNTER — DOCUMENTATION (OUTPATIENT)
Dept: PHYSICAL THERAPY | Facility: CLINIC | Age: 79
End: 2024-11-13
Payer: MEDICARE

## 2024-11-13 NOTE — LETTER
2024    Ziggy Murray MD  [address]  [facility] [address]    Patient: Rosy Browning   YOB: 1945   Date of Visit: 2024       Dear No referring provider defined for this encounter.    The attached plan of care is being sent to you because your patient’s medical reimbursement requires that you certify the plan of care. Your signature is required to allow uninterrupted insurance coverage.      You may indicate your approval by signing below and faxing this form back to us at Dept Fax: [phone].    Please call Dept: [phone] with any questions or concerns.    Thank you for this referral,        Iram Rome PT  [facility] [address] [place] [address]    Payer: Payor: MEDICARE / Plan: MEDICARE PART A AND B / Product Type: *No Product type* /                                                                         Date:     Dear Iram Rome PT,     Re: Ms. Rosy Browning, MRN:[id number]    I certify that I have reviewed the attached plan of care and it is medically necessary for Ms. Rosy Browning (1945) who is under my care.          ______________________________________                    _________________  Provider name and credentials                                           Date and time                                                                                           Plan of Care 24   Effective from: 2024  Effective to: 2024    Plan ID: [id number]            Participants as of Finalize on 2024    Name Type Comments Contact Info    Ziggy Murray MD Consulting Physician  [phone]    Iram Rome PT Physical Therapist  [phone]       Last Plan Note     Author: Iram Rome PT Status: Sign when Signing Visit Last edited: 2024  4:51 PM       Physical Therapy    Discharge Summary    Name: Rosy Browning  MRN: [id number]  :  1945  Date: 11/13/24    Discharge Summary: PT    Discharge Information: Date of discharge 11/13/24, Date of last visit 9/26/24, Date of evaluation 8/20/24, Number of attended visits 4, Referred by Ziggy Murray, and Referred for Chronic low back pain, lumbar stenosis, muscular deconditioning, declining mobility    Therapy Summary: Pt attended 4 PT visits in the pool and then cancelled remaining session. Current status unknown.    Rehab Discharge Reason: Failed to schedule and/or keep follow-up appointment(s)         Current Participants as of 11/13/2024    Name Type Comments Contact Info    Ziggy Murray MD Consulting Physician  [phone]    Signature pending    Iram Rome, ANTHONY Physical Therapist  [phone]

## 2024-11-13 NOTE — PROGRESS NOTES
Physical Therapy    Discharge Summary    Name: Rosy Browning  MRN: [id number]  : 1945  Date: 24    Discharge Summary: PT    Discharge Information: Date of discharge 24, Date of last visit 24, Date of evaluation 24, Number of attended visits 4, Referred by Ziggy Murray, and Referred for Chronic low back pain, lumbar stenosis, muscular deconditioning, declining mobility    Therapy Summary: Pt attended 4 PT visits in the pool and then cancelled remaining session. Current status unknown.    Rehab Discharge Reason: Failed to schedule and/or keep follow-up appointment(s)  
Please inform it is ready
Additional Notes: Patient consent was obtained to proceed with the visit and recommended plan of care after discussion of all risks and benefits, including the risks of COVID-19 exposure.
Render Risk Assessment In Note?: yes
Detail Level: Simple
Render Risk Assessment In Note?: no
Additional Notes: Abscess is very mild and not significantly inflamed. No indication for incision and drainage. Recommended warm compresses and a course of antibiotics followed by excision with surgeon.